# Patient Record
Sex: FEMALE | Race: WHITE | NOT HISPANIC OR LATINO | Employment: OTHER | URBAN - METROPOLITAN AREA
[De-identification: names, ages, dates, MRNs, and addresses within clinical notes are randomized per-mention and may not be internally consistent; named-entity substitution may affect disease eponyms.]

---

## 2024-03-20 ENCOUNTER — MEDICAL CORRESPONDENCE (OUTPATIENT)
Dept: HEALTH INFORMATION MANAGEMENT | Facility: CLINIC | Age: 39
End: 2024-03-20
Payer: COMMERCIAL

## 2024-03-20 ENCOUNTER — LAB REQUISITION (OUTPATIENT)
Dept: LAB | Facility: CLINIC | Age: 39
End: 2024-03-20

## 2024-03-20 ENCOUNTER — PATIENT OUTREACH (OUTPATIENT)
Dept: ONCOLOGY | Facility: CLINIC | Age: 39
End: 2024-03-20
Payer: COMMERCIAL

## 2024-03-20 ENCOUNTER — TRANSFERRED RECORDS (OUTPATIENT)
Dept: HEALTH INFORMATION MANAGEMENT | Facility: CLINIC | Age: 39
End: 2024-03-20
Payer: COMMERCIAL

## 2024-03-20 ENCOUNTER — TRANSCRIBE ORDERS (OUTPATIENT)
Dept: OTHER | Age: 39
End: 2024-03-20

## 2024-03-20 DIAGNOSIS — Z80.0 FAMILY HISTORY OF PANCREATIC CANCER: Primary | ICD-10-CM

## 2024-03-20 DIAGNOSIS — N92.1 EXCESSIVE AND FREQUENT MENSTRUATION WITH IRREGULAR CYCLE: ICD-10-CM

## 2024-03-20 PROCEDURE — 87389 HIV-1 AG W/HIV-1&-2 AB AG IA: CPT | Performed by: FAMILY MEDICINE

## 2024-03-20 PROCEDURE — 80061 LIPID PANEL: CPT | Performed by: FAMILY MEDICINE

## 2024-03-20 PROCEDURE — 83550 IRON BINDING TEST: CPT | Performed by: FAMILY MEDICINE

## 2024-03-20 PROCEDURE — 84443 ASSAY THYROID STIM HORMONE: CPT | Performed by: FAMILY MEDICINE

## 2024-03-20 PROCEDURE — 86803 HEPATITIS C AB TEST: CPT | Performed by: FAMILY MEDICINE

## 2024-03-20 NOTE — PROGRESS NOTES
Writer received referral to Cancer Risk Management/Genetic Counseling.    Referred for:     family history of pancreatic cancer        Referral reviewed for appropriate plan, and sent to New Patient Scheduling ([phone]) for completion.    Fátima Albert, RN, BSN  Oncology New Patient Nurse Navigator   [facility]  [phone]

## 2024-03-21 LAB
CHOLEST SERPL-MCNC: 150 MG/DL
FASTING STATUS PATIENT QL REPORTED: ABNORMAL
HCV AB SERPL QL IA: NONREACTIVE
HDLC SERPL-MCNC: 41 MG/DL
HIV 1+2 AB+HIV1 P24 AG SERPL QL IA: NONREACTIVE
IRON BINDING CAPACITY (ROCHE): 347 UG/DL (ref 240–430)
IRON SATN MFR SERPL: 31 % (ref 15–46)
IRON SERPL-MCNC: 107 UG/DL (ref 37–145)
LDLC SERPL CALC-MCNC: 93 MG/DL
NONHDLC SERPL-MCNC: 109 MG/DL
TRIGL SERPL-MCNC: 78 MG/DL
TSH SERPL DL<=0.005 MIU/L-ACNC: 1.85 UIU/ML (ref 0.3–4.2)

## 2024-05-29 ENCOUNTER — LAB REQUISITION (OUTPATIENT)
Dept: LAB | Facility: CLINIC | Age: 39
End: 2024-05-29

## 2024-05-29 DIAGNOSIS — Z12.4 ENCOUNTER FOR SCREENING FOR MALIGNANT NEOPLASM OF CERVIX: ICD-10-CM

## 2024-05-29 PROCEDURE — G0145 SCR C/V CYTO,THINLAYER,RESCR: HCPCS | Performed by: OBSTETRICS & GYNECOLOGY

## 2024-05-29 PROCEDURE — 87624 HPV HI-RISK TYP POOLED RSLT: CPT | Performed by: OBSTETRICS & GYNECOLOGY

## 2024-05-30 LAB
HPV HR 12 DNA CVX QL NAA+PROBE: NEGATIVE
HPV16 DNA CVX QL NAA+PROBE: NEGATIVE
HPV18 DNA CVX QL NAA+PROBE: NEGATIVE
HUMAN PAPILLOMA VIRUS FINAL DIAGNOSIS: NORMAL

## 2024-06-03 LAB
BKR LAB AP GYN ADEQUACY: NORMAL
BKR LAB AP GYN INTERPRETATION: NORMAL
PATH REPORT.COMMENTS IMP SPEC: NORMAL
PATH REPORT.COMMENTS IMP SPEC: NORMAL

## 2024-06-14 ENCOUNTER — HOSPITAL ENCOUNTER (EMERGENCY)
Facility: HOSPITAL | Age: 39
Discharge: HOME | End: 2024-06-14
Attending: EMERGENCY MEDICINE | Admitting: EMERGENCY MEDICINE
Payer: COMMERCIAL

## 2024-06-14 ENCOUNTER — APPOINTMENT (OUTPATIENT)
Dept: RADIOLOGY | Facility: HOSPITAL | Age: 39
End: 2024-06-14
Payer: COMMERCIAL

## 2024-06-14 VITALS
DIASTOLIC BLOOD PRESSURE: 77 MMHG | RESPIRATION RATE: 26 BRPM | SYSTOLIC BLOOD PRESSURE: 141 MMHG | TEMPERATURE: 98.7 F | WEIGHT: 200 LBS | HEART RATE: 62 BPM | OXYGEN SATURATION: 98 %

## 2024-06-14 DIAGNOSIS — R07.89 CHEST PAIN, NON-CARDIAC: ICD-10-CM

## 2024-06-14 PROBLEM — G47.30 SLEEP APNEA: Status: ACTIVE | Noted: 2024-06-14

## 2024-06-14 LAB
ANION GAP SERPL CALCULATED.3IONS-SCNC: 12 MMOL/L (ref 7–15)
BUN SERPL-MCNC: 13.4 MG/DL (ref 6–20)
CALCIUM SERPL-MCNC: 10.1 MG/DL (ref 8.6–10)
CHLORIDE SERPL-SCNC: 102 MMOL/L (ref 98–107)
CREAT SERPL-MCNC: 0.68 MG/DL (ref 0.51–0.95)
DEPRECATED HCO3 PLAS-SCNC: 25 MMOL/L (ref 22–29)
EGFRCR SERPLBLD CKD-EPI 2021: >90 ML/MIN/1.73M2
ERYTHROCYTE [DISTWIDTH] IN BLOOD BY AUTOMATED COUNT: 12.4 % (ref 10–15)
GLUCOSE SERPL-MCNC: 109 MG/DL (ref 70–99)
HCG UR QL: NEGATIVE
HCT VFR BLD AUTO: 40 % (ref 35–47)
HGB BLD-MCNC: 13.3 G/DL (ref 11.7–15.7)
MCH RBC QN AUTO: 28.5 PG (ref 26.5–33)
MCHC RBC AUTO-ENTMCNC: 33.3 G/DL (ref 31.5–36.5)
MCV RBC AUTO: 86 FL (ref 78–100)
PLATELET # BLD AUTO: 392 10E3/UL (ref 150–450)
POTASSIUM SERPL-SCNC: 3.8 MMOL/L (ref 3.4–5.3)
RBC # BLD AUTO: 4.67 10E6/UL (ref 3.8–5.2)
SODIUM SERPL-SCNC: 139 MMOL/L (ref 135–145)
TROPONIN T SERPL HS-MCNC: <6 NG/L
WBC # BLD AUTO: 16.7 10E3/UL (ref 4–11)

## 2024-06-14 PROCEDURE — 85014 HEMATOCRIT: CPT

## 2024-06-14 PROCEDURE — 36415 COLL VENOUS BLD VENIPUNCTURE: CPT

## 2024-06-14 PROCEDURE — 71046 X-RAY EXAM CHEST 2 VIEWS: CPT

## 2024-06-14 PROCEDURE — 99285 EMERGENCY DEPT VISIT HI MDM: CPT | Mod: 25

## 2024-06-14 PROCEDURE — 93005 ELECTROCARDIOGRAM TRACING: CPT | Performed by: EMERGENCY MEDICINE

## 2024-06-14 PROCEDURE — 84484 ASSAY OF TROPONIN QUANT: CPT

## 2024-06-14 PROCEDURE — 93005 ELECTROCARDIOGRAM TRACING: CPT | Performed by: STUDENT IN AN ORGANIZED HEALTH CARE EDUCATION/TRAINING PROGRAM

## 2024-06-14 PROCEDURE — 81025 URINE PREGNANCY TEST: CPT

## 2024-06-14 PROCEDURE — 80048 BASIC METABOLIC PNL TOTAL CA: CPT

## 2024-06-14 RX ORDER — FAMOTIDINE 20 MG/1
TABLET, FILM COATED ORAL
Qty: 20 TABLET | Refills: 0 | Status: SHIPPED | OUTPATIENT
Start: 2024-06-14 | End: 2024-06-14

## 2024-06-14 ASSESSMENT — ACTIVITIES OF DAILY LIVING (ADL)
ADLS_ACUITY_SCORE: 35
ADLS_ACUITY_SCORE: 35

## 2024-06-14 ASSESSMENT — COLUMBIA-SUICIDE SEVERITY RATING SCALE - C-SSRS
2. HAVE YOU ACTUALLY HAD ANY THOUGHTS OF KILLING YOURSELF IN THE PAST MONTH?: NO
6. HAVE YOU EVER DONE ANYTHING, STARTED TO DO ANYTHING, OR PREPARED TO DO ANYTHING TO END YOUR LIFE?: NO
1. IN THE PAST MONTH, HAVE YOU WISHED YOU WERE DEAD OR WISHED YOU COULD GO TO SLEEP AND NOT WAKE UP?: NO

## 2024-06-14 NOTE — ED TRIAGE NOTES
Pt was sitting reading at 1630 and began to experience chest discomfort that she thought was heartburn. She took pepto bismol and tums and 3 ibuprofen and got back to the couch and thought the sensation improved, but then she got flushed and lost hearing, got sweaty, dizzy and almost passed out. Pt then had all symptoms resolve and later put a bra on and started to feel the pain again. Pt is currently pain free in triage.      Triage Assessment (Adult)       Row Name 06/14/24 8906          Triage Assessment    Airway WDL WDL        Respiratory WDL    Respiratory WDL WDL        Skin Circulation/Temperature WDL    Skin Circulation/Temperature WDL WDL        Cardiac WDL    Cardiac WDL X  chest discomfort with near syncope at 1630 today, now resolved        Peripheral/Neurovascular WDL    Peripheral Neurovascular WDL WDL        Cognitive/Neuro/Behavioral WDL    Cognitive/Neuro/Behavioral WDL WDL

## 2024-06-15 NOTE — DISCHARGE INSTRUCTIONS
It was a pleasure taking care of you in the emergency department today. We saw you for chest pain. We ran some tests including an EKG and blood work, all of which were reassuring. The exact cause for your symptoms are unclear, but based on your evaluation today, they DO NOT appear to be due to a cause requiring emergent intervention at this time.    You may continue taking pepto bismol for acid reflux symptoms but I recommend omeprazole for 4 weeks as this works better for acid suppression.     Call 911 anytime you think you may need emergency care. For example, call if:    You have chest pain or pressure. This may occur with:  Sweating.  Shortness of breath.  Nausea or vomiting.  Pain that spreads from the chest to the neck, jaw, or one or both shoulders or arms.  Dizziness or lightheadedness.  A fast or uneven pulse.  After calling 911, chew 1 adult-strength aspirin. Wait for an ambulance. Do not try to drive yourself.     You have sudden chest pain and shortness of breath, or you cough up blood.   Call your doctor now or seek immediate medical care if:    You have any trouble breathing.     Your chest pain gets worse.     Your chest pain occurs consistently with exercise and is relieved by rest.   Watch closely for changes in your health, and be sure to contact your doctor if:    Your chest pain does not get better after 1 week.       We are always happy to help you out here at [facility] EMERGENCY DEPARTMENT. Take care.

## 2024-06-15 NOTE — ED PROVIDER NOTES
"Emergency Department Encounter  [facility] EMERGENCY DEPARTMENT  Melania Lopez   AGE: 39 year old SEX: female  YOB: 1985  MRN: [id number]      EVALUATION DATE & TIME: 6/14/2024  7:04 PM ; PCP: Daniella Edwards   ED PROVIDER: Ara Alcaraz PA-C    CHIEF COMPLAINT: Chest Pain (1630)      MEDICAL DECISION MAKING   Melania Lopez is a 39 year old female with a pertinent history of acid reflux and sleep apnea who presents to the ED for evaluation of chest pain that started earlier this evening when patient was relaxing and reading a book.  Patient initially thought the pain was due to heartburn and took Pepto-Bismol but the chest pain worsened and was associated with diaphoresis, dizziness, and ringing in ears.  Patient endorses that she felt like she was going to pass out.  At the time, pain described as \"squeezing\" and 8/10 in intensity.  No fevers, chills, nausea, vomiting, headache, abdominal pain, or shortness of breath.    Vitals reviewed and hemodynamically stable, afebrile.  On exam, patient is well-appearing and in no acute distress. No diaphoresis or dyspnea. Rhythm and rate regular without murmur, S3 gallop, or diminished or muffled heart sound. There is no focal wheezing or crackles. No asymmetric extremity swelling or pitting pedal edema. Abdomen is nontender tender.    Unsure the exact cause of patient's chest pain but it appears to be noncardiac in nature, possibly due to underlying GERD.  EKG does not show acute ischemic changes, cardiac enzymes are WNL, minimizing concern for ACS.  Chest XR independently interpreted and without evidence of pneumonia, widened mediastinum, abnormal aortic contour, mediastinal free air, pleural effusion, cardiomegaly, pneumothorax, and acute bony abnormality. CBC with mild leukocytosis (16.7) but without any other concerning signs of infection.  BMP without evidence of significant anemia, severe hemorrhage, electrolyte " abnormality, or kidney dysfunction. Patient is without significant risk factors for DVT or PE and Wells low risk, PERC negative and therefore additional workup with D-dimer not indicated. Patient is without signs of fluid overload minimizing concern for congestive heart failure. I have low suspicion for aortic dissection as the onset of pain was neither sudden nor severe, there is no pulse deficit in extremities, and the patient is without known aortic pathology. There was no proceeding forceful vomiting, trauma, or recent endoscopy to suggest esophageal rupture. I also considered, but have low suspicion for, pericarditis, myocarditis, pericardial effusion, and cardiac tamponade.    HEART Score 0 points putting patient at  0.9-1.7% risk of major adverse cardiac event. Patient does not have any signs of hemodynamic instability or significant respiratory distress and is without known coronary artery disease.  Patient has had an uneventful observation period, negative serial cardiac markers, and is therefore a good candidate for discharge home with close primary care follow up.  Prescription for omeprazole provided for better acid reflux control.    Medical Decision Making  Obtained supplemental history:Supplemental history obtained?: No  Reviewed external records: External records reviewed?: No  Care impacted by chronic illness:Other: Acid Reflux  Care significantly affected by social determinants of health:N/A  Did you consider but not order tests?: Work up considered but not performed and documented in chart, if applicable  Did you interpret images independently?: Independent interpretation of ECG and images noted in documentation, when applicable.  Consultation discussion with other provider:Did you involve another provider (consultant, , pharmacy, etc.)?: No  Discharge. I prescribed additional prescription strength medication(s) as charted. See documentation for any additional details.    FINAL IMPRESSION        "ICD-10-CM    1. Chest pain, non-cardiac  R07.89           ED COURSE   7:08 PM I met and introduced myself to the patient. I gathered initial history and performed my physical exam. We discussed plan for initial workup.  7:56 PM I have staffed the patient with Dr. Fernandes, ED MD, who has evaluated the patient and agrees with all aspects of today's care.   8:50 PM I rechecked the patient and discussed results, discharge, follow up, and reasons to return to the ED.     MEDICATIONS GIVEN IN THE EMERGENCY DEPARTMENT:   Medications - No data to display   NEW PRESCRIPTIONS STARTED AT TODAY'S ED VISIT:  Discharge Medication List as of 6/14/2024  8:56 PM        START taking these medications    Details   omeprazole (PRILOSEC) 20 MG DR capsule Take 1 capsule (20 mg) by mouth daily, Disp-30 capsule, R-0, Local Print                 =================================================================         HISTORY OF PRESENT ILLNESS   Patient information was obtained from: Patient   Use of Intrepreter: None    Melania Lopez is a 39 year old female with a pertinent history of acid reflux and sleep apnea who presents to the ED by walk-in for evaluation of chest pain.     Today, the patient was sitting on her couch reading a book when she had a sudden onset of chest pain which she initially thought was heartburn. The patient mentions going upstairs and taking Pepto Bismol, however, upon coming down the stairs to return back to the couch patient reports a change to her symptoms, including the heart burn becoming more of a \"squeezing,\" substernal sensation, with associated diaphoresis, dizziness, and hearing changes. She endorses that in the past when her hearing changed, as it did today, she almost passes out. Reports this being the same feeling today. The symptoms only lasted briefly, then resolved. Upon calling someone to see if anyone would be able to bring her to be evaluated, she reports the symptoms returning with associated " nausea. She then was brought here and notes that previous to arriving her symptoms again subsided and she has not had them resurface since being here.     She denies any swelling in her legs, shortness of breath, chest pain, abdominal pain, calf tenderness, pain with breathing or recent illnesses.     The patient reports that she has a history of heart burn, and that it has been under control for the past 3 years. Patient avoids triggers and sleeps elevated to keep her heart burn under control. She mentions having a family history of blood clots, and that her last menstrual period was 2 weeks ago. Reports having sleep apnea and using a mouth guard for the past 3 weeks. Denies any recent travel or being on a birth control. Patient does, however, mention working from home and does sit quite a lot.     Otherwise in normal state of health. No further concerns at this time.       MEDICAL HISTORY     No past medical history on file.    History reviewed. No pertinent surgical history.    No family history on file.         omeprazole (PRILOSEC) 20 MG DR capsule        PHYSICAL EXAM   VITALS:  Patient Vitals for the past 24 hrs:   BP Temp Temp src Pulse Resp SpO2 Weight   06/14/24 2030 (!) 141/77 -- -- 62 26 98 % --   06/14/24 2020 135/74 -- -- 62 19 97 % --   06/14/24 2007 133/76 -- -- 64 13 99 % --   06/14/24 1947 131/73 -- -- 55 14 98 % --   06/14/24 1752 (!) 161/88 98.7  F (37.1  C) Oral 63 18 99 % 90.7 kg (200 lb)     There is no height or weight on file to calculate BMI.     Vitals reviewed. Nursing notes reviewed.  CONSITUTIONAL: Generally well appearing female in no acute distress. Well developed and nourished.   EYES: Conjunctivae clear, no discharge. EOM grossly intact.  HENT: Normocephalic, atraumatic, mucous membranes moist, nose normal.  NECK: Supple, gross ROM intact.   RESPIRATORY: Effort normal, speaks in full sentences.  Lungs clear to auscultation bilaterally without rhonchi, wheezes, rales.   CARDIO:  Normal heart rate, regular rhythm, no murmurs. No pedal edema.   GI: Soft, nondistended. Abdomen is non tender to palpation.  MSK: Moving all 4 extremities intentionally and without pain.  SKIN: Warm, dry. No rashes or lesions.  NEURO:  AxOx4. CN II-XII grossly intact, but not individually tested. No focal neurological deficits.   PSYCH: Thoughts linear and responses appropriate. Cooperative. Appropriate mood and affect.     LABS AND IMAGING   All pertinent labs reviewed and interpreted  Labs Ordered and Resulted from Time of ED Arrival to Time of ED Departure   CBC WITH PLATELETS - Abnormal       Result Value    WBC Count 16.7 (*)     RBC Count 4.67      Hemoglobin 13.3      Hematocrit 40.0      MCV 86      MCH 28.5      MCHC 33.3      RDW 12.4      Platelet Count 392     BASIC METABOLIC PANEL - Abnormal    Sodium 139      Potassium 3.8      Chloride 102      Carbon Dioxide (CO2) 25      Anion Gap 12      Urea Nitrogen 13.4      Creatinine 0.68      GFR Estimate >90      Calcium 10.1 (*)     Glucose 109 (*)    TROPONIN T, HIGH SENSITIVITY - Normal    Troponin T, High Sensitivity <6     HCG QUALITATIVE URINE - Normal    hCG Urine Qualitative Negative         Chest XR,  PA & LAT   Final Result   IMPRESSION: Negative chest.          ECG:  Performed at: [facility] EMERGENCY DEPARTMENT    Impression: Sinus bradycardia. Possible left atrial enlargement  Rate: 58 BPM  Rhythm: Sinus  Axis: normal  WI Interval: 128 ms  QRS Interval: 86 ms  QTc Interval: 422 ms  ST Changes: None  Comparison: No previous EKGs    EKG results reviewed and interpreted by Dr. Dottie Fernandes, ED MD.     Melvi STRAUSS, am serving as a scribe to document services personally performed by Ara Alcaraz PA-C, based on my observation and the provider's statements to me. Ara STRAUSS PA-C attest that Melvi Fournier is acting in a scribe capacity, has observed my performance of the services and has documented them  in accordance with my direction.     Ara Alcaraz PA-C   Emergency Medicine   [facility] EMERGENCY DEPARTMENT  [facility] [facility] [id number] [phone]  Dept: [phone]      Ara Alcaraz PA-C  06/15/24 0026

## 2024-06-15 NOTE — ED PROVIDER NOTES
Emergency Department Staff Note  I had a face to face encounter with this patient seen by the Advanced Practice Provider (RICHARD).  I personally made/approved the management plan and take responsibility for the patient management. I personally saw the patient and performed a substantive portion of the visit including all aspects of the medical decision making.      ED Course as of 06/14/24 2116 Fri Jun 14, 2024 2056 Patient is a pleasant 39-year-old female who comes in today for evaluation of some chest discomfort.  She reports that it started around 430 and she initially thought it was heartburn.  She denied any associated shortness of breath.  It was in her lower mid chest and radiated into her back which is pretty typical.  She took some ibuprofen and Pepto-Bismol initially.  She did not really get much relief so decided to come in here and get checked out.  She says her heartburn has been well-controlled for the last 3 years with diet control.  She is not currently on an acid suppressing medicine.  She had clear lung sounds on my exam.  Troponin was negative.  EKG was unremarkable.  Chest x-ray was negative.  I do not think we can to find an emergent cause at this time and I think she can try a course of omeprazole at home and see if that helps.  I discussed all of this with her and she is in agreement with the plan.       EKG  Date and time: June 14, 2024 at 1758  Rate: 58 bpm  Rhythm: Sinus bradycardia  VA interval: 128 ms  QRS interval: 86 ms  QT/QTc: 430/422 ms  ST changes or T wave changes: No acute ST or T wave abnormality  Change from prior ECG: No prior to compare to  I have independently reviewed and interpreted this EKG.     I independently interpreted the following study(s): Chest x-ray was negative for pneumothorax.  See full radiology report for all details.    [facility] EMERGENCY DEPARTMENT  MD Dena Clemons, Dottie Villeda MD  06/14/24 2117

## 2024-06-18 LAB
ATRIAL RATE - MUSE: 58 BPM
DIASTOLIC BLOOD PRESSURE - MUSE: NORMAL MMHG
INTERPRETATION ECG - MUSE: NORMAL
P AXIS - MUSE: 53 DEGREES
PR INTERVAL - MUSE: 128 MS
QRS DURATION - MUSE: 86 MS
QT - MUSE: 430 MS
QTC - MUSE: 422 MS
R AXIS - MUSE: 28 DEGREES
SYSTOLIC BLOOD PRESSURE - MUSE: NORMAL MMHG
T AXIS - MUSE: 27 DEGREES
VENTRICULAR RATE- MUSE: 58 BPM

## 2024-07-03 ENCOUNTER — VIRTUAL VISIT (OUTPATIENT)
Dept: ONCOLOGY | Facility: CLINIC | Age: 39
End: 2024-07-03
Attending: FAMILY MEDICINE
Payer: COMMERCIAL

## 2024-07-03 DIAGNOSIS — Z80.0 FAMILY HISTORY OF MALIGNANT NEOPLASM OF PANCREAS: Primary | ICD-10-CM

## 2024-07-03 DIAGNOSIS — Z80.3 FAMILY HISTORY OF MALIGNANT NEOPLASM OF BREAST: ICD-10-CM

## 2024-07-03 DIAGNOSIS — Z80.0 FAMILY HISTORY OF MALIGNANT NEOPLASM OF COLON: ICD-10-CM

## 2024-07-03 PROCEDURE — 96040 HC GENETIC COUNSELING, EACH 30 MINUTES: CPT | Mod: GT,95 | Performed by: GENETIC COUNSELOR, MS

## 2024-07-03 NOTE — PATIENT INSTRUCTIONS
Assessing Cancer Risk  Only about 5-10% of cancers are thought to be due to an inherited cancer susceptibility gene. These families often have:  Several people with the same or related types of cancer  Cancers diagnosed at a young age (before age 50)  Individuals with more than one primary cancer  Multiple generations of the family affected with cancer    Some people may be candidates for genetic testing of more than one gene.  For these families, genetic testing using a cancer panel may be offered.  These panels can test many genes at once known to increase the risk for pancreatic (and other) cancers: APC, ALESSANDRA, BRCA1, BRCA2, CDKN2A, EPCAM, MLH1, MSH2, MSH6, PALB2, PMS2, STK11, and TP53. The purpose of this handout is to serve as a brief summary of the pancreatic cancer risk genes and cancer syndrome with pancreatic cancer risks.     Hereditary Breast and Ovarian Cancer Syndrome  (BRCA1 and BRCA2)    A single mutation in one of the copies of BRCA1 or BRCA2 increases the risk for breast and ovarian cancer. The risk for pancreatic cancer approximately 5-10%. BRCA2 is considered the most common gene responsible for familial pancreatic cancer.    A person s ethnic background is also important to consider, as individuals of Ashkenazi Mosque ancestry have a higher chance of having a BRCA gene mutation. There are three BRCA mutations that occur more frequently in this population.    Chahal Syndrome  (MLH1, MSH2, MSH6, PMS2, and EPCAM)    Currently five genes are known to cause Chahal Syndrome: MLH1, MSH2, MSH6, PMS2, and EPCAM.  A single mutation in one of the Chahal Syndrome genes increases the risk for colon, endometrial, ovarian, and stomach cancers.  Other cancers that occur less commonly in Chahal Syndrome include urinary tract cancers, brain cancers, and other cancers. People who have Chahal Syndrome have up to a 6% risk of pancreatic cancer. MLH1 has the highest risk for pancreatic cancer amongst the Chahal syndrome  genes.    *Cancer risk varies depending on Chahal syndrome gene found    Familial Atypical Multiple Mole Melanoma Syndrome  (CDKN2A)    Familial Atypical Multiple Mole Melanoma Syndrome (FAMMM) is caused by a single mutation in the CDKN2A gene. This gene used to be called p16. The lifetime risk for melanoma is between 58-92%5. People with FAMMM have increased risks for pancreatic cancer, and possibly other cancers.      People with FAMMM typically have many moles (more than 50), which can be atypical. The exact risk of pancreatic cancer can depend on a person s specific CDKN2A mutation. The risk for pancreatic cancer at least 15%, but may be higher depending on a person's specific mutation.    Peutz-Jeghers Syndrome  (STK11)    Peutz-Jeghers Syndrome is caused by a mutation in the STK11 gene. The main features of Peutz-Jeghers Syndrome are multiple hamartomatous colon polyps and blue pigmentation of the lips and oral mucosa. Cancers associated with Peutz-Jeghers Syndrome include: gastrointestinal, gynecological, lung, breast, and pancreatic cancer. The risk of developing pancreatic cancer at least 15% for those with Peutz-Jeghers syndrome.     Li-Fraumeni Syndrome  (TP53)    Li-Fraumeni Syndrome (LFS) is a cancer predisposition syndrome caused by a mutation in the TP53 gene. A single mutation in one of the copies of TP53 increases the risk for multiple cancers. Individuals with LFS are at an increased risk for developing cancer at a young age. The lifetime risk for development of a LFS-associated cancer is 50% by age 30 and 90% by age 60. The risk for pancreatic cancer specifically is approximately 5-10%.  Core Cancers: Sarcomas, Breast, Brain, Lung, Leukemias/Lymphomas, Adrenocortical carcinomas  Other Cancers: Gastrointestinal (including pancreatic), Thyroid, Skin, Genitourinary    Familial Adenomatous Polyposis Syndrome  (APC)    Familial Adenomatous Polyposis syndrome (FAP) is caused by a single mutation in the  APC gene and is characterize by having over 100 adenomatous polyps in the colon and significantly increased risk for colon cancer. These typically appear during adolescence. FAP is associated with other tumors in the thyroid, stomach, and duodenum. People with FAP have an increased lifetime pancreatic cancer risk over the general population, however, the exact risk is currently not known at this time.    Additional Genes  PALB2  Mutations in the PALB2 gene have been shown to increase the risk of breast cancer up to 58% in some families. PALB2 mutations have also been associated with increased risk for pancreatic cancer. Individuals who inherit two PALB2 mutations--one from their mother and one from their father--have a condition called Fanconi Anemia. This condition is associated with short stature, developmental delay, bone marrow failure, and increased risk for childhood cancers.    ALESSANDRA  Mutations in the ALESSANDRA gene typically increase the risk of breast cancer 2-4 times higher than an average woman. ALESSANDRA mutations have also been associated with an increased risk of pancreatic cancer. People who inherit an ALESSANDRA mutation from both their mother and father have a condition called ataxia-telangiectasia. Ataxia telangiectasia is associated with ataxia in childhood (trouble with balance and walking), telangiectasias (red or purple spotty clusters on the skin), involuntary movements, frequent infections, and increased risk of leukemia and lymphoma.     Inheritance    All of the genes reviewed above are inherited in an autosomal dominant pattern.  This means that if a parent has a mutation, each of their children will have a 50% chance of inheriting that same mutation.  Every child--male or female--would have a 50% chance of inheriting the mutation and being at increased risk for developing cancer.       https://r.nlm.nih.gov/primer/inheritance/inheritancepatterns    Mutations in some genes can occur de shira, which means that  a person s mutation occurred for the first time in them and was not inherited from a parent.  Now that they have the mutation, however, it can be passed on to future generations.     Genetic Testing  Genetic testing involves a blood test and will look for any harmful mutations that are associated with increased cancer risk.  If possible, it is recommended that the person(s) who has had cancer be tested before other family members.  That person will give us the most useful information about whether or not a specific gene is associated with the cancer in the family.    Advantages and Disadvantages   There are advantages and disadvantages to genetic testing.  Advantages  May clarify your cancer risk and additional appropriate cancer screenings   Can help you make medical decisions  May explain the cancers in your family  May give useful information to your family members (if you share your results)     Disadvantages  Possible negative emotional impact of learning about inherited cancer risk  Uncertainty in interpreting a negative test result in some situations  Possible genetic discrimination concerns (see below)     Genetic Information Nondiscrimination Act (MARCOS)  MARCOS is a federal law that protects individuals from health insurance or employment discrimination based on a genetic test result.  There are currently no legal discrimination protections in terms of life insurance, long term care, or disability insurances.  Visit the National Human Genome Research [place] website to learn more: https://www.genome.gov/65655707/genetic-discrimination/    Questions to Think About Regarding Genetic Testing:  What effect will the test result have on me and my relationship with my family members if I have an inherited gene mutation?  If I don t have a gene mutation?  Should I share my test results, and how will my family react to this news, which may also affect them?  Are my children ready to learn new information that may  one day affect their own health?    There are three possible results of genetic testing:  Positive--a harmful mutation was identified in one or more of the genes  Negative--no mutation was identified in any of the genes on this panel  Variant of unknown significance (VUS)--a variation in one of the genes was identified, but it is unclear how this impacts cancer risk in the family  Families with VUS results can contact their genetic counselor annually for updates     Reducing Cancer Risk  Recommendations are based upon an individual s genetic test result as well as their personal and family history of cancer. Pancreatic cancer screening may be available based on family history. Talk with your physician about screening options.     Cancer Resources    National Pancreatic Cancer Foundation: npcf.   Pancreatic Cancer Action Network: pancan.org   FORCE: Facing Our Risk of Cancer Empowered: facingourrisk.org  Bright [place]: bebrightpink.org  Li-Fraumeni Syndrome Association: lfsassociation.org  Collaborative Group of the Americas on Inherited Colorectal Cancer (CGA)   cgaicc.[employer] http://www.facingourrisPurpose Global.org/  Cancer Care: cancercare.org  American Cancer Society (ACS): cancer.org  National Cancer [place] (NCI): cancer.gov      Please call us if you have any questions or concerns.   Cancer Risk Management Program [phone] ([phone])  Shayne Hooks, MS [facility] [place]  [phone]  Maribeth Daugherty, MS, [facility] [place] [phone]  Katalina Blackwood, MS, [facility] [place]  [phone]  Evi Wade, MS, [facility] [place]  [phone]  Rola Noel, MS, [facility] [place]  [phone]  Jesika Nichols, MS, [facility] [place] [phone]  Rosemary Lisa, MS, [facility] [place] [phone]    References  Genetic / Familial High-Risk Assessment?: Breast and Ovarian. NCCN Pract Guidel Oncol. 2017;1.2018.  Garett J, Quinn DUEÑAS, Martina J, et al. The incidence of pancreatic cancer in BRCA1 and BRCA2 mutation carriers. Br J Cancer. 2012;107(12):8388-4471. doi:10.1038/bjc.2012.483.  Luisito LEMOS, Maki KG, Lamberto S, et al.  BRCA1, BRCA2, PALB2, and CDKN2A mutations in familial pancreatic cancer: a PACGENE study. [facility] Med. 2015;17(7):569-577. doi:10.1038/gim.2014.153.  Hector YIP. Genetic / Familial High-Risk Assessment?: Colorectal. NCCN Pract Guidel Oncol. 2017;2.2017.  Erica CARNEY,  M, Radha E, Ankita J. Familial Atypical Multiple Mole Melanoma Syndrome. National Center for Biotechnology Information (); 2009. http://www.ncbi.nlm.nih.gov/pubmed/48940791. Accessed October 10, 2017.  Chahal HT, Fusbrandie RM, Ifeoma J, et al. Tumour spectrum in the FAMMM syndrome. Br J Cancer. 1981;44(4):553-560. http://www.ncbi.nlm.nih.gov/pubmed/5622426. Accessed October 10, 2017.  Bronson F, Jennifer J, Elvira A, et al. Very high risk of cancer in familial Peutz-Jeghers syndrome. Gastroenterology. 2000;119(6):0419-9844. doi:10.1053/PURVI.2000.87951.  Bronson FM, Offerhaus GJ, Pedro DH, et al. Increased risk of thyroid and pancreatic carcinoma in familial adenomatous polyposis. Gut. 1993;34(10):0136-3178. doi:10.1136/GUT.34.10.1394.

## 2024-07-03 NOTE — PROGRESS NOTES
7/3/2024    Referring Provider: Daniella Edwards MD    Presenting Information:   I met with Melania for her video genetic counseling visit, through the Cancer Risk Management Program, to discuss her family history of pancreatic, breast, and colon cancer. Today we reviewed this history, cancer screening recommendations, and available genetic testing options.    Personal History:  Melania is a 39 year old year old female. She does not have any personal history of cancer. She had her first menstrual period at age 11, she does not have children, and is premenopausal. Melania has her ovaries, fallopian tubes and uterus in place, and she has had no ovarian cancer screening to date. She reports that she has not used hormone replacement therapy. Melania has not had a mammogram or colonoscopy. She does not regularly do any other cancer screening at this time.     Family History: (Please see scanned pedigree for detailed family history information)  Melania's father was diagnosed with and  from pancreatic cancer at age 60. He is reported to have a history of smoking.  Melania's paternal grandmother was diagnosed with pancreatic cancer at age 46 and  at age 49. She is reported to have a history of smoking.  Her mother, Melania's great grandmother, was diagnosed with breast and pancreatic cancer at age 42 and also  at age 42.  A paternal aunt had brain surgery but it is unknown if this was due to a tumor or some other health concern.  Melania's maternal grandfather  from colon cancer at age 65.  There is no other reported maternal family history of cancer.  Her maternal ethnicity is [place], English, and Scandinavian. Her paternal ethnicity is [place] and [place]. There is no known Ashkenazi Congregational ancestry on either side of her family. There is no reported consanguinity.    Discussion:  Melania's family history of pancreatic, breast, and colon cancer is suggestive of a hereditary cancer syndrome.  We reviewed the  features of sporadic, familial, and hereditary cancers. We discussed that mutations in either BRCA1 or BRCA2 could be possible hereditary explanations for her family history of cancer. Mutations in the BRCA1 or BRCA2 gene are known to cause Hereditary Breast and Ovarian Cancer Syndrome (HBOC). HBOC typically presents with multiple family members diagnosed with breast cancer before age 50 and/or ovarian cancer. Other cancer risks associated with HBOC include male breast cancer, prostate cancer, pancreatic cancer, and melanoma.   We discussed the natural history and genetics of hereditary cancer. A detailed handout regarding hereditary cancer, along with the other information we discussed, will be mailed to Melania at the end of our appointment today and can be found in the after visit summary. Topics included: inheritance pattern, cancer risks, cancer screening recommendations, and also risks, benefits and limitations of testing.  Based on her personal and family history, Melania meets current National Comprehensive Cancer Network (NCCN) criteria for genetic testing of BRCA1/2 along with other high-penetrance pancreatic cancer susceptibility genes (I.e. ALESSANDRA, CDKN2A, EPCAM, MLH1, MSH2, MSH6, PALB2, STK11, and TP53).  We discussed that there are additional genes that could cause increased risk for pancreatic, breast, and/or colon cancer. As many of these genes present with overlapping features in a family and accurate cancer risk cannot always be established based upon the pedigree analysis alone, it would be reasonable for Melania to consider panel genetic testing to analyze multiple genes at once.  Genetic testing is available for BRCA1/2 as part of the patient's core panel. This will then be automatically reflexed to [facility]'s Multi-Cancer panel.  Genetic testing is available for 70 genes associated with increased risk for many different cancers: Multi-Cancers panel (AIP, ALK, APC, ALESSANDRA, AXIN2, BAP1, BARD1, BLM, BMPR1A,  BRCA1, BRCA2, BRIP1, CDC73, CDH1, CDK4, CDKN1B, CDKN2A, CHEK2, CTNNA1, DICER1, EGFR, EPCAM, FH, FLCN, GREM1, HOXB13, KIT, LZTR1, MAX, MBD4, MEN1, MET, MITF, MLH1, MSH2, MSH3, MSH6, MUTYH, NF1, NF2, NTHL1, PALB2, PDGFRA, PMS2, POLD1, POLE, POT1, PQQPZ0P, PTCH1, PTEN, RAD51C, RAD51D, RB1, RET, SDHA, SDHAF2, SDHB, SDHC, SDHD, SMAD4, SMARCA4, SMARCB1, SMARCE1, STK11, SUFU, FLIP949, TP53, TSC1, TSC2, and VHL).  We discussed that many of the genes in the Multi-Cancers panel are associated with specific hereditary cancer syndromes and have published management guidelines: Hereditary Breast and Ovarian Cancer syndrome (BRCA1, BRCA2), Chahal syndrome (MLH1, MSH2, MSH6, PMS2, EPCAM), Familial Adenomatous Polyposis (APC), Hereditary Diffuse Gastric Cancer (CDH1), Familial Atypical Multiple Mole Melanoma syndrome (CDK4, CDKN2A), Juvenile Polyposis syndrome (BMPR1A, SMAD4), Cowden syndrome (PTEN), Li Fraumeni syndrome (TP53), Peutz-Jeghers syndrome (STK11), MUTYH Associated Polyposis (MUTYH), Hereditary Leiomyomatosis and Renal Cell Cancer (FH), Wtcw-Vgts-Mlap (FLCN), Hereditary Papillary Renal Carcinoma (MET), Hereditary Paraganglioma and Pheochromocytoma syndrome (SDHA, SDHAF2, SDHB, SDHC, SDHD), Multiple Endocrine Neoplasia type 2 (RET), Tuberous sclerosis complex (TSC1, TSC2), Von Hippel-Lindau disease (VHL), Neurofibromatosis type 1 (NF1), Neurofibromatosis type 2 (NF2), Multiple Endocrine Neoplasia type 1 (MEN1), Multiple Endocrine Neoplasia type 4 (CDKN1B), Gorlin syndrome (SUFU, PTCH1), and Leone complex (AFHVN3B).  The ALESSANDRA, AXIN2, BARD1, BRIP1, CHEK2, GREM1, MSH3, NTHL1, PALB2, POLD1, POLE, RAD51C, and RAD51D genes are associated with increased cancer risk and have published management guidelines for certain cancers.    The remaining genes (AIP, ALK, BAP1, BLM, CDC73, CTNNA1, DICER1, EGFR, HOXB13, KIT, LZTR1, MAX, MBD4, MITF, PDGFRA, POT1, REST, RB1, SMARCA4, SMARCB1, SMARCE1, and DXOA935) are associated with increased  cancer risk and may allow us to make medical recommendations when mutations are identified.   Melania stated that she would prefer to submit a saliva kit for her genetic testing. [facility] will send a kit directly to her home with directions on how to collect a saliva sample. We discussed that there is a small chance for sample failure due to contamination of the sample. To help minimize this, she should follow the directions that are sent with the kit. Melania verbalized understanding of this. Once the sample is collected, she will send it to [facility] using the return envelope and prepaid shipping label.   Verbal consent was given over video and written on the consent form. Turnaround time is approximately 4 weeks once the lab receives the sample.  Should Melania have any questions regarding the billing for her genetic testing, she should visit [facility]'s billing website at [email or web address] or call them at [phone].  The possible outcomes of positive, negative, and uncertain were discussed with Melania. A detailed handout that explains this in detail was provided to the patient.  Medical Management: For Melania, we reviewed that the information from genetic testing may determine:  additional cancer screening for which Melania may qualify (i.e. mammogram and breast MRI, more frequent colonoscopies, more frequent dermatologic exams, etc.),  options for risk reducing surgeries Melania could consider (i.e. bilateral mastectomy, surgery to remove her ovaries and/or uterus, etc.),    and targeted chemotherapies if she were to develop certain cancers in the future (i.e. immunotherapy for individuals with Chahal syndrome, PARP inhibitors, etc.).   These recommendations and possible targeted chemotherapies will be discussed in detail once genetic testing is completed.     Plan:  1) Today Melania elected to proceed with BRCA1/2 testing with automatic reflex to [facility]'s Multi-Cancers  panel.  2) A copy of the consent form and the after visit summary will be sent to Melania.  3) This information should be available in approximately 4 weeks, once the lab receives the sample.  4) I will call Melania with the results once they become available.    Time spent on video: 23 minutes    Shayne Hooks MS, [facility] [place]  Licensed, Certified Genetic Counselor      Virtual Visit Details    Type of service:  Video Visit     Originating Location (pt. Location): Home  Distant Location (provider location):  Off-site  Platform used for Video Visit: Erika

## 2024-07-03 NOTE — Clinical Note
"7/3/2024      Melania Lopez  [address] [place] [address]      Dear Colleague,    Thank you for referring your patient, Melania Lopez, to the [facility]. Please see a copy of my visit note below.    7/3/2024    Virtual Visit Details    Type of service:  Video Visit     Originating Location (pt. Location): {video visit patient location:253986::\"Home\"}  {PROVIDER LOCATION On-site should be selected for visits conducted from your clinic location or adjoining [facility] hospital, academic office, or other nearby [facility] building. Off-site should be selected for all other provider locations, including home:502891}  Distant Location (provider location):  {virtual location provider:208922}  Platform used for Video Visit: {Virtual Visit Platforms:355686::\"VertiFlex\"}    Referring Provider: Daniella Edwards MD    Presenting Information:   I met with Melania for her video genetic counseling visit, through the Cancer Risk Management Program, to discuss her family history of pancreatic, breast, and colon cancer. Today we reviewed this history, cancer screening recommendations, and available genetic testing options.    Personal History:  Melania is a 39 year old year old female. She does not have any personal history of cancer. She had her first menstrual period at age 11, she does not have children, and is premenopausal. Melania has her ovaries, fallopian tubes and uterus in place, and she has had no ovarian cancer screening to date. She reports that she has not used hormone replacement therapy. Melania has not had a mammogram or colonoscopy. She does not regularly do any other cancer screening at this time.     Family History: (Please see scanned pedigree for detailed family history information)  Melania's father was diagnosed with and  from pancreatic cancer at age 60. ***genetic testing  Melania's paternal grandmother was diagnosed with pancreatic cancer at age 42 and  at age 46.  Her mother, " Melania's great grandmother, was diagnosed with breast and pancreatic cancer at unknown ages.***  Melania's maternal grandfather  from colon cancer at age 65.  There is no other reported maternal family history of cancer.  Her maternal ethnicity is Guatem[place], English, and Scandinavian. Her paternal ethnicity is Luxembourgish [place]divian and [place]. There is no known Ashkenazi Bahai ancestry on either side of her family. There is no reported consanguinity.    Discussion:  Melania's personal and family history of *** is suggestive of a hereditary cancer syndrome.  We reviewed the features of sporadic, familial, and hereditary cancers. ***  We discussed the natural history and genetics of hereditary cancer. A detailed handout regarding hereditary cancer, along with the other information we discussed, will be mailed to Melania at the end of our appointment today and can be found in the after visit summary. Topics included: inheritance pattern, cancer risks, cancer screening recommendations, and also risks, benefits and limitations of testing.  Based on her personal and family history, Melania meets current National Comprehensive Cancer Network (NCCN) criteria for genetic testing of ***.  We discussed that there are additional genes that could cause increased risk for pancreatic, breast, and/or colon cancer. As many of these genes present with overlapping features in a family and accurate cancer risk cannot always be established based upon the pedigree analysis alone, it would be reasonable for Melania to consider panel genetic testing to analyze multiple genes at once.  Genetic testing is available for *** as part of the patient's core panel. This will then be automatically reflexed to *** panel.  ***  ***Melania stated that she would prefer to submit a saliva kit for her genetic testing. [facility] will send a kit directly to her home with directions on how to collect a saliva sample. We discussed that there is a small chance for sample  failure due to contamination of the sample. To help minimize this, she should follow the directions that are sent with the kit. Melania verbalized understanding of this. Once the sample is collected, she will send it to [employer] using the return envelope and prepaid shipping label.   Verbal consent was given over video and written on the consent form. Turnaround time is approximately 4 weeks once the lab receives the sample.  Should Melania have any questions regarding the billing for her genetic testing, she should visit [employer]'s billing website at [email or web address] or call them at [phone].  The possible outcomes of positive, negative, and uncertain were discussed with Melania. A detailed handout that explains this in detail was provided to the patient.  Medical Management: For Melania, we reviewed that the information from genetic testing may determine:  additional cancer screening for which Melania may qualify (i.e. mammogram and breast MRI, more frequent colonoscopies, more frequent dermatologic exams, etc.),  options for risk reducing surgeries Melania could consider (i.e. bilateral mastectomy, surgery to remove her ovaries and/or uterus, etc.),    and targeted chemotherapies if she were to develop certain cancers in the future (i.e. immunotherapy for individuals with Chahal syndrome, PARP inhibitors, etc.).   These recommendations and possible targeted chemotherapies will be discussed in detail once genetic testing is completed.     Plan:  1) Today Melania elected to proceed with *** testing with automatic reflex to ***.  2) A copy of the consent form and the after visit summary will be sent to Melania.  3) This information should be available in approximately 4 weeks, once the lab receives the sample.  4) I will call Melania with the results once they become available.    Time spent on video: *** minutes    Shayne Hooks MS, CGC  Licensed, Certified Genetic  Counselor    ***      Again, thank you for allowing me to participate in the care of your patient.        Sincerely,        Shayne Hooks GC

## 2024-07-03 NOTE — LETTER
July 3, 2024    Melania Lopez  [address]  [place] [address]      Dear Melania,    It was a pleasure speaking with you over video on 7/3/2024. Here is a copy of the progress note from our discussion. If you have any additional questions, please feel free to call.    Referring Provider: Daniella Edwards MD    Presenting Information:   I met with Melania for her video genetic counseling visit, through the Cancer Risk Management Program, to discuss her family history of pancreatic, breast, and colon cancer. Today we reviewed this history, cancer screening recommendations, and available genetic testing options.    Personal History:  Melania is a 39 year old year old female. She does not have any personal history of cancer. She had her first menstrual period at age 11, she does not have children, and is premenopausal. Melania has her ovaries, fallopian tubes and uterus in place, and she has had no ovarian cancer screening to date. She reports that she has not used hormone replacement therapy. Melania has not had a mammogram or colonoscopy. She does not regularly do any other cancer screening at this time.     Family History: (Please see scanned pedigree for detailed family history information)  Melania's father was diagnosed with and  from pancreatic cancer at age 60. He is reported to have a history of smoking.  Melania's paternal grandmother was diagnosed with pancreatic cancer at age 46 and  at age 49. She is reported to have a history of smoking.  Her mother, Melania's great grandmother, was diagnosed with breast and pancreatic cancer at age 42 and also  at age 42.  A paternal aunt had brain surgery but it is unknown if this was due to a tumor or some other health concern.  Melania's maternal grandfather  from colon cancer at age 65.  There is no other reported maternal family history of cancer.  Her maternal ethnicity is Luxembourger, English, and Scandinavian. Her paternal ethnicity is [place] and  Togolese. There is no known Ashkenazi Catholic ancestry on either side of her family. There is no reported consanguinity.    Discussion:  Melania's family history of pancreatic, breast, and colon cancer is suggestive of a hereditary cancer syndrome.  We reviewed the features of sporadic, familial, and hereditary cancers. We discussed that mutations in either BRCA1 or BRCA2 could be possible hereditary explanations for her family history of cancer. Mutations in the BRCA1 or BRCA2 gene are known to cause Hereditary Breast and Ovarian Cancer Syndrome (HBOC). HBOC typically presents with multiple family members diagnosed with breast cancer before age 50 and/or ovarian cancer. Other cancer risks associated with HBOC include male breast cancer, prostate cancer, pancreatic cancer, and melanoma.   We discussed the natural history and genetics of hereditary cancer. A detailed handout regarding hereditary cancer, along with the other information we discussed, will be mailed to Melania at the end of our appointment today and can be found in the after visit summary. Topics included: inheritance pattern, cancer risks, cancer screening recommendations, and also risks, benefits and limitations of testing.  Based on her personal and family history, Melania meets current National Comprehensive Cancer Network (NCCN) criteria for genetic testing of BRCA1/2 along with other high-penetrance pancreatic cancer susceptibility genes (I.e. ALESSANDRA, CDKN2A, EPCAM, MLH1, MSH2, MSH6, PALB2, STK11, and TP53).  We discussed that there are additional genes that could cause increased risk for pancreatic, breast, and/or colon cancer. As many of these genes present with overlapping features in a family and accurate cancer risk cannot always be established based upon the pedigree analysis alone, it would be reasonable for Melania to consider panel genetic testing to analyze multiple genes at once.  Genetic testing is available for BRCA1/2 as part of the patient's  core panel. This will then be automatically reflexed to [facility]'s Multi-Cancer panel.  Genetic testing is available for 70 genes associated with increased risk for many different cancers: Multi-Cancers panel (AIP, ALK, APC, ALESSANDRA, AXIN2, BAP1, BARD1, BLM, BMPR1A, BRCA1, BRCA2, BRIP1, CDC73, CDH1, CDK4, CDKN1B, CDKN2A, CHEK2, CTNNA1, DICER1, EGFR, EPCAM, FH, FLCN, GREM1, HOXB13, KIT, LZTR1, MAX, MBD4, MEN1, MET, MITF, MLH1, MSH2, MSH3, MSH6, MUTYH, NF1, NF2, NTHL1, PALB2, PDGFRA, PMS2, POLD1, POLE, POT1, ZDAZY9F, PTCH1, PTEN, RAD51C, RAD51D, RB1, RET, SDHA, SDHAF2, SDHB, SDHC, SDHD, SMAD4, SMARCA4, SMARCB1, SMARCE1, STK11, SUFU, GGZF668, TP53, TSC1, TSC2, and VHL).  We discussed that many of the genes in the Multi-Cancers panel are associated with specific hereditary cancer syndromes and have published management guidelines: Hereditary Breast and Ovarian Cancer syndrome (BRCA1, BRCA2), Chahal syndrome (MLH1, MSH2, MSH6, PMS2, EPCAM), Familial Adenomatous Polyposis (APC), Hereditary Diffuse Gastric Cancer (CDH1), Familial Atypical Multiple Mole Melanoma syndrome (CDK4, CDKN2A), Juvenile Polyposis syndrome (BMPR1A, SMAD4), Cowden syndrome (PTEN), Li Fraumeni syndrome (TP53), Peutz-Jeghers syndrome (STK11), MUTYH Associated Polyposis (MUTYH), Hereditary Leiomyomatosis and Renal Cell Cancer (FH), Gcyq-Jvae-Ydxg (FLCN), Hereditary Papillary Renal Carcinoma (MET), Hereditary Paraganglioma and Pheochromocytoma syndrome (SDHA, SDHAF2, SDHB, SDHC, SDHD), Multiple Endocrine Neoplasia type 2 (RET), Tuberous sclerosis complex (TSC1, TSC2), Von Hippel-Lindau disease (VHL), Neurofibromatosis type 1 (NF1), Neurofibromatosis type 2 (NF2), Multiple Endocrine Neoplasia type 1 (MEN1), Multiple Endocrine Neoplasia type 4 (CDKN1B), Gorlin syndrome (SUFU, PTCH1), and Leone complex (MKWXA9N).  The ALESSANDRA, AXIN2, BARD1, BRIP1, CHEK2, GREM1, MSH3, NTHL1, PALB2, POLD1, POLE, RAD51C, and RAD51D genes are associated with increased cancer risk and  have published management guidelines for certain cancers.    The remaining genes (AIP, ALK, BAP1, BLM, CDC73, CTNNA1, DICER1, EGFR, HOXB13, KIT, LZTR1, MAX, MBD4, MITF, PDGFRA, POT1, REST, RB1, SMARCA4, SMARCB1, SMARCE1, and DNCR330) are associated with increased cancer risk and may allow us to make medical recommendations when mutations are identified.   Melania stated that she would prefer to submit a saliva kit for her genetic testing. [employer] will send a kit directly to her home with directions on how to collect a saliva sample. We discussed that there is a small chance for sample failure due to contamination of the sample. To help minimize this, she should follow the directions that are sent with the kit. Melania verbalized understanding of this. Once the sample is collected, she will send it to [employer] using the return envelope and prepaid shipping label.   Verbal consent was given over video and written on the consent form. Turnaround time is approximately 4 weeks once the lab receives the sample.  Should Melania have any questions regarding the billing for her genetic testing, she should visit [employer]'s billing website at [email or web address] or call them at [phone].  The possible outcomes of positive, negative, and uncertain were discussed with Melania. A detailed handout that explains this in detail was provided to the patient.  Medical Management: For Melania, we reviewed that the information from genetic testing may determine:  additional cancer screening for which Melania may qualify (i.e. mammogram and breast MRI, more frequent colonoscopies, more frequent dermatologic exams, etc.),  options for risk reducing surgeries Melania could consider (i.e. bilateral mastectomy, surgery to remove her ovaries and/or uterus, etc.),    and targeted chemotherapies if she were to develop certain cancers in the future (i.e. immunotherapy for individuals with Chahal syndrome, PARP  inhibitors, etc.).   These recommendations and possible targeted chemotherapies will be discussed in detail once genetic testing is completed.     Plan:  1) Today Melania elected to proceed with BRCA1/2 testing with automatic reflex to [facility] Multi-Cancers panel.  2) A copy of the consent form and the after visit summary will be sent to Melania.  3) This information should be available in approximately 4 weeks, once the lab receives the sample.  4) I will call Melania with the results once they become available.    Time spent on video: 23 minutes    Shayne Hooks MS, [facility]  Licensed, Certified Genetic Counselor

## 2024-07-03 NOTE — NURSING NOTE
Is the patient currently in the state of [place]? YES    Current patient location: [address] [place] [address]    Visit mode:VIDEO    If the visit is dropped, the patient can be reconnected by: VIDEO VISIT:  Send e-mail to at [email or web address]    Will anyone else be joining the visit? No  (If patient encounters technical issues they should call [phone])    How would you like to obtain your AVS? MyChart    Are changes needed to the allergy or medication list? N/A    Are refills needed on medications prescribed by this physician? NO    Rooming Documentation: Assigned questionnaire(s) completed .    Reason for visit: Consult     ALLY Whitman

## 2024-07-08 ENCOUNTER — LAB REQUISITION (OUTPATIENT)
Dept: LAB | Facility: CLINIC | Age: 39
End: 2024-07-08

## 2024-07-08 DIAGNOSIS — R12 HEARTBURN: ICD-10-CM

## 2024-07-08 DIAGNOSIS — D72.828 OTHER ELEVATED WHITE BLOOD CELL COUNT: ICD-10-CM

## 2024-07-08 LAB
BASOPHILS # BLD AUTO: 0.1 10E3/UL (ref 0–0.2)
BASOPHILS NFR BLD AUTO: 1 %
EOSINOPHIL # BLD AUTO: 0.2 10E3/UL (ref 0–0.7)
EOSINOPHIL NFR BLD AUTO: 2 %
ERYTHROCYTE [DISTWIDTH] IN BLOOD BY AUTOMATED COUNT: 12.6 % (ref 10–15)
HCT VFR BLD AUTO: 41.2 % (ref 35–47)
HGB BLD-MCNC: 13.1 G/DL (ref 11.7–15.7)
IMM GRANULOCYTES # BLD: 0 10E3/UL
IMM GRANULOCYTES NFR BLD: 0 %
LIPASE SERPL-CCNC: 27 U/L (ref 13–60)
LYMPHOCYTES # BLD AUTO: 2.4 10E3/UL (ref 0.8–5.3)
LYMPHOCYTES NFR BLD AUTO: 31 %
MCH RBC QN AUTO: 28.6 PG (ref 26.5–33)
MCHC RBC AUTO-ENTMCNC: 31.8 G/DL (ref 31.5–36.5)
MCV RBC AUTO: 90 FL (ref 78–100)
MONOCYTES # BLD AUTO: 0.7 10E3/UL (ref 0–1.3)
MONOCYTES NFR BLD AUTO: 9 %
NEUTROPHILS # BLD AUTO: 4.5 10E3/UL (ref 1.6–8.3)
NEUTROPHILS NFR BLD AUTO: 57 %
NRBC # BLD AUTO: 0 10E3/UL
NRBC BLD AUTO-RTO: 0 /100
PLATELET # BLD AUTO: 413 10E3/UL (ref 150–450)
RBC # BLD AUTO: 4.58 10E6/UL (ref 3.8–5.2)
WBC # BLD AUTO: 7.9 10E3/UL (ref 4–11)

## 2024-07-08 PROCEDURE — 85025 COMPLETE CBC W/AUTO DIFF WBC: CPT | Performed by: FAMILY MEDICINE

## 2024-07-08 PROCEDURE — 83690 ASSAY OF LIPASE: CPT | Performed by: FAMILY MEDICINE

## 2024-07-28 ENCOUNTER — HEALTH MAINTENANCE LETTER (OUTPATIENT)
Age: 39
End: 2024-07-28

## 2024-08-01 DIAGNOSIS — Z80.0 FAMILY HISTORY OF MALIGNANT NEOPLASM OF COLON: ICD-10-CM

## 2024-08-01 DIAGNOSIS — Z15.09 MONOALLELIC MUTATION OF CDKN2A GENE: ICD-10-CM

## 2024-08-01 DIAGNOSIS — Z15.01 MONOALLELIC MUTATION OF CDKN2A GENE: ICD-10-CM

## 2024-08-01 DIAGNOSIS — Z80.3 FAMILY HISTORY OF MALIGNANT NEOPLASM OF BREAST: ICD-10-CM

## 2024-08-01 DIAGNOSIS — Z80.0 FAMILY HISTORY OF MALIGNANT NEOPLASM OF PANCREAS: Primary | ICD-10-CM

## 2024-08-02 ENCOUNTER — LAB REQUISITION (OUTPATIENT)
Dept: LAB | Facility: CLINIC | Age: 39
End: 2024-08-02
Payer: COMMERCIAL

## 2024-08-02 DIAGNOSIS — R10.13 EPIGASTRIC PAIN: ICD-10-CM

## 2024-08-02 PROCEDURE — 87338 HPYLORI STOOL AG IA: CPT | Performed by: FAMILY MEDICINE

## 2024-08-05 ENCOUNTER — VIRTUAL VISIT (OUTPATIENT)
Dept: ONCOLOGY | Facility: CLINIC | Age: 39
End: 2024-08-05
Attending: GENETIC COUNSELOR, MS
Payer: COMMERCIAL

## 2024-08-05 DIAGNOSIS — Z15.01 MONOALLELIC MUTATION OF CDKN2A GENE: Primary | ICD-10-CM

## 2024-08-05 DIAGNOSIS — Z80.3 FAMILY HISTORY OF MALIGNANT NEOPLASM OF BREAST: ICD-10-CM

## 2024-08-05 DIAGNOSIS — Z15.09 MONOALLELIC MUTATION OF CDKN2A GENE: Primary | ICD-10-CM

## 2024-08-05 DIAGNOSIS — Z80.0 FAMILY HISTORY OF MALIGNANT NEOPLASM OF PANCREAS: ICD-10-CM

## 2024-08-05 DIAGNOSIS — Z80.0 FAMILY HISTORY OF MALIGNANT NEOPLASM OF COLON: ICD-10-CM

## 2024-08-05 PROCEDURE — 96040 HC GENETIC COUNSELING, EACH 30 MINUTES: CPT | Mod: GT,95 | Performed by: GENETIC COUNSELOR, MS

## 2024-08-05 NOTE — NURSING NOTE
Current patient location: [address] [place] [address]    Is the patient currently in the state of [place]? YES    Visit mode:VIDEO    If the visit is dropped, the patient can be reconnected by: VIDEO VISIT: Text to cell phone:   Telephone Information:   Mobile [phone]       Will anyone else be joining the visit? NO  (If patient encounters technical issues they should call [phone]    How would you like to obtain your AVS? MyChart    Are changes needed to the allergy or medication list? N/A    Are refills needed on medications prescribed by this physician? NO    Rooming Documentation:  Not applicable    Reason for visit: KATE CANALES

## 2024-08-05 NOTE — Clinical Note
"8/5/2024      Melania Lopez  [address] [place] [address]      Dear Colleague,    Thank you for referring your patient, Melania Lopez, to the [facility]. Please see a copy of my visit note below.    Virtual Visit Details    Type of service:  Video Visit     Originating Location (pt. Location): {video visit patient location:048703::\"Home\"}  {PROVIDER LOCATION On-site should be selected for visits conducted from your clinic location or adjoining [facility] hospital, academic office, or other nearby [facility] building. Off-site should be selected for all other provider locations, including home:058472}  Distant Location (provider location):  {virtual location provider:572583}  Platform used for Video Visit: {Virtual Visit Platforms:964555::\"[employer]\"}      Again, thank you for allowing me to participate in the care of your patient.        Sincerely,        Shayne Hooks GC  "

## 2024-08-05 NOTE — PROGRESS NOTES
"8/5/2024    Referring Provider: Daniella Edwards MD    Presenting Information:   I spoke to Melania by video today to discuss her genetic testing results. A saliva kit was sent to her house on 7/3/24. The Multi-Cancers panel was ordered from [facility]. This testing was done because of Melania's family history of pancreatic, breast, and colon cancer.     Genetic Testing Results: POSITIVE  Melania is POSITIVE for a CDKN2A mutation. Specifically her mutation is called c.377T>A (p.[id number]). We discussed that this mutation is associated with a diagnosis of CDKN2A and increased risk for pancreatic cancer and melanoma. We discussed the impact of this testing on Melania in detail.     Of note, Melania is negative for any pathogenic (harmful) mutations in the AIP, ALK, APC, ALESSANDRA, AXIN2, BAP1, BARD1, BLM, BMPR1A, BRCA1, BRCA2, BRIP1, CDC73, CDH1, CDK4, CDKN1B, CHEK2, CTNNA1, DICER1, EGFR, EPCAM, FH, FLCN, GREM1, HOXB13, KIT, LZTR1, MAX, MBD4, MEN1, MET, MITF, MLH1, MSH2, MSH3, MSH6, MUTYH, NF1, NF2, NTHL1, PALB2, PDGFRA, PMS2, POLD1, POLE, POT1, IWMDQ9C, PTCH1, PTEN, RAD51C, RAD51D, RB1, RET, SDHA, SDHAF2, SDHB, SDHC, SDHD, SMAD4, SMARCA4, SMARCB1, SMARCE1, STK11, SUFU, [id number], TP53, TSC1, TSC2, and VHL genes. No pathogenic mutations were found in any of the other 69 genes analyzed. This test involved sequencing and deletion/duplication analysis of all genes with the exceptions of EPCAM and GREM1 (deletions/duplications only), MITF (only the status of the c.952G>A (p.E318K) alteration is analyzed and reported), and SDHA (sequencing only). We reviewed the autosomal dominant inheritance of these genes. Melania cannot pass on a mutation in any of these genes to her children based on this test result. Mutations in these genes do not skip generations.      A copy of the test report can be found in the Laboratory tab, dated 7/17/24, and named \"LABORATORY MISCELLANEOUS ORDER\". The report is scanned in as a linked document.    CDKN2A Cancer " Risks:  Melanoma:  The lifetime risk for melanoma is between 28-76% (PMID 39467430, PMID 22423274). Some studies indicate that these risks may vary, due to different factors.  Pancreatic Cancer:  The exact risk of pancreatic cancer can depend on a person s specific CDKN2A mutation and/or environmental exposures, but has been reported as 17% by age 75 by one larger study (PMID: 23974255). The National Comprehensive Cancer Network (NCCN) states that the lifetime risk of pancreatic cancer is greater than 15%.    CDKN2A mutations may also confer increased risks for other cancers, however more research is needed in this area. Exact risk estimates for additional cancer risks are not available at this time.     Cancer Screening and Prevention (Erica Shay et al 2009, Margie et al 2013, Marilyn et al 2015):  Melanoma screening:  Total body skin exam, starting at age 10  Nevi should be examined for ABCDE features of melanoma  Evaluation by their provider every 6 months. Once the patient understands how to conduct a self-examination, these visits can be conducted annually  Monthly total body skin exam performed by the patient with the help of a friend or family member.  Changes should be reported to a physician.   Pancreatic cancer screening:  Pancreatic cancer screening with endoscopic ultrasonography (EUS) and/or MRI/magnetic resonance cholangiopancreatography can be considered for individuals with a CDKN2A mutation. The American College of Gastroenterology recommends this for individuals with CDKN2A mutations. The National Comprehensive Cancer Network (NCCN) recommends considering screening beginning at age 40 (or 10 years younger than the earliest pancreatic cancer diagnosis in the family, whichever comes first).   In Melania s case, pancreatic cancer screening could be considered at age 36.     We discussed that Melania could be seen by a GI specialist to discuss an individual screening plan and assists with medical  management. A referral was placed for Melania to be seen by Dr. Hector Kaiser or Dr. Rodrigue Goodman to discuss pancreatic cancer screening.    Other screening based on Melania's personal and family history:  Other population cancer screening options, such as those recommended by the American Cancer Society and the National Comprehensive Cancer Network (NCCN), are also appropriate for Melania and her family. These screening recommendations may change if there are changes to Melania's personal and/or family history of cancer. Final screening recommendations should be made in consultation with each individual's primary care provider.    Of note, the above information is based on our current understanding of Melania's genetic findings. Melania is encouraged to reach out to me regularly regarding any pertinent updates to her personal and/or family history of cancer, as our understanding of the genetic findings in her family may change over time.     Implications for Family Members:  We reviewed that mutations in the CDKN2A gene are inherited in an autosomal dominant pattern. This means that each of Melania's children, should she have any, have a 50% chance of inheriting the same mutation. Likewise, her sister has a 50% risk of having the same mutation. Extended relatives may also carry this mutation, and she is encouraged to share this information with her family members on both sides of the family, However, the mutation is most likely coming from her paternal side of the family. I am happy to help her relatives connect with a genetic counselor in their area if they would like to discuss testing.     We discussed that her sister already had negative genetic testing, however, I still encouraged Melania to share this information with her sister so that we can confirm that she is truly negative for this mutation.    Additional Testing Considerations:  Even though Melania's genetic testing result was positive, other relatives may  "carry the same CDKN2A mutation or a different gene mutation associated with breast, pancreatic, and/or colon cancer. Genetic counseling is recommended for other close paternal relatives to discuss genetic testing options. Her sister should talk to her Genetic Counselor about next steps in verifying that she is negative for the familial mutation as well. If any of these relatives do pursue genetic testing, Melania is encouraged to contact me so that we may review the impact of their test results on her.    Plan:  1.  A copy of the test results will be sent to Melania.  2.  I will provide a \"Dear Relative\" letter for Melania to share with her family members.  3.  She plans to follow up with other providers.  4.  A referral was placed for Melania to meet with Dr. Hector Kaiser or Dr. Rodrigue Goodman to discuss pancreatic cancer screening associated with a CDKN2A mutation.    If Melania has additional questions, I encouraged her to contact me directly via [employer].     Time spent on video: 16 minutes    Shayne Hooks MS, [facility] [place]  Licensed, Certified Genetic Counselor      Virtual Visit Details    Type of service:  Video Visit     Originating Location (pt. Location): Home  Distant Location (provider location):  Off-site  Platform used for Video Visit: Erika"

## 2024-08-05 NOTE — LETTER
August 5, 2024    Melania Lopez  [address]  [place] [address]      Dear Melania,    It was a pleasure speaking with you over video on 8/5/2024. Here is a copy of the progress note from our discussion. If you have any additional questions, please feel free to call.    Referring Provider: Daniella Edwards MD    Presenting Information:   I spoke to Melania by video today to discuss her genetic testing results. A saliva kit was sent to her house on 7/3/24. The Multi-Cancers panel was ordered from [facility]. This testing was done because of Melania's family history of pancreatic, breast, and colon cancer.     Genetic Testing Results: POSITIVE  Melania is POSITIVE for a CDKN2A mutation. Specifically her mutation is called c.377T>A (p.[id number]). We discussed that this mutation is associated with a diagnosis of CDKN2A and increased risk for pancreatic cancer and melanoma. We discussed the impact of this testing on Melania in detail.     Of note, Melania is negative for any pathogenic (harmful) mutations in the AIP, ALK, APC, ALESSANDRA, AXIN2, BAP1, BARD1, BLM, BMPR1A, BRCA1, BRCA2, BRIP1, CDC73, CDH1, CDK4, CDKN1B, CHEK2, CTNNA1, DICER1, EGFR, EPCAM, FH, FLCN, GREM1, HOXB13, KIT, LZTR1, MAX, MBD4, MEN1, MET, MITF, MLH1, MSH2, MSH3, MSH6, MUTYH, NF1, NF2, NTHL1, PALB2, PDGFRA, PMS2, POLD1, POLE, POT1, RZOHF9Q, PTCH1, PTEN, RAD51C, RAD51D, RB1, RET, SDHA, SDHAF2, SDHB, SDHC, SDHD, SMAD4, SMARCA4, SMARCB1, SMARCE1, STK11, SUFU, PFQR569, TP53, TSC1, TSC2, and VHL genes. No pathogenic mutations were found in any of the other 69 genes analyzed. This test involved sequencing and deletion/duplication analysis of all genes with the exceptions of EPCAM and GREM1 (deletions/duplications only), MITF (only the status of the c.952G>A (p.E318K) alteration is analyzed and reported), and SDHA (sequencing only). We reviewed the autosomal dominant inheritance of these genes. Melania cannot pass on a mutation in any of these genes to her children  "based on this test result. Mutations in these genes do not skip generations.      A copy of the test report can be found in the Laboratory tab, dated 7/17/24, and named \"LABORATORY MISCELLANEOUS ORDER\". The report is scanned in as a linked document.      CDKN2A Cancer Risks:  Melanoma:  The lifetime risk for melanoma is between 28-76% (PMID 05034995, PMID 93802862). Some studies indicate that these risks may vary, due to different factors.  Pancreatic Cancer:  The exact risk of pancreatic cancer can depend on a person s specific CDKN2A mutation and/or environmental exposures, but has been reported as 17% by age 75 by one larger study (PMID: 11347233). The National Comprehensive Cancer Network (NCCN) states that the lifetime risk of pancreatic cancer is greater than 15%.    CDKN2A mutations may also confer increased risks for other cancers, however more research is needed in this area. Exact risk estimates for additional cancer risks are not available at this time.     Cancer Screening and Prevention (Erica Shay et al 2009, Margie et al 2013, Marilny et al 2015):  Melanoma screening:  Total body skin exam, starting at age 10  Nevi should be examined for ABCDE features of melanoma  Evaluation by their provider every 6 months. Once the patient understands how to conduct a self-examination, these visits can be conducted annually  Monthly total body skin exam performed by the patient with the help of a friend or family member.  Changes should be reported to a physician.   Pancreatic cancer screening:  Pancreatic cancer screening with endoscopic ultrasonography (EUS) and/or MRI/magnetic resonance cholangiopancreatography can be considered for individuals with a CDKN2A mutation. The American College of Gastroenterology recommends this for individuals with CDKN2A mutations. The National Comprehensive Cancer Network (NCCN) recommends considering screening beginning at age 40 (or 10 years younger than the earliest pancreatic " cancer diagnosis in the family, whichever comes first).   In Melania s case, pancreatic cancer screening could be considered at age 36.     We discussed that Melania could be seen by a GI specialist to discuss an individual screening plan and assists with medical management. A referral was placed for Melania to be seen by Dr. Hector Kaiser or Dr. Rodrigue Goodman to discuss pancreatic cancer screening.    Other screening based on Melania's personal and family history:  Other population cancer screening options, such as those recommended by the American Cancer Society and the National Comprehensive Cancer Network (NCCN), are also appropriate for Melania and her family. These screening recommendations may change if there are changes to Melania's personal and/or family history of cancer. Final screening recommendations should be made in consultation with each individual's primary care provider.    Of note, the above information is based on our current understanding of Melania's genetic findings. Melania is encouraged to reach out to me regularly regarding any pertinent updates to her personal and/or family history of cancer, as our understanding of the genetic findings in her family may change over time.     Implications for Family Members:  We reviewed that mutations in the CDKN2A gene are inherited in an autosomal dominant pattern. This means that each of Melania's children, should she have any, have a 50% chance of inheriting the same mutation. Likewise, her sister has a 50% risk of having the same mutation. Extended relatives may also carry this mutation, and she is encouraged to share this information with her family members on both sides of the family, However, the mutation is most likely coming from her paternal side of the family. I am happy to help her relatives connect with a genetic counselor in their area if they would like to discuss testing.     We discussed that her sister already had negative genetic testing,  "however, I still encouraged Melania to share this information with her sister so that we can confirm that she is truly negative for this mutation.    Additional Testing Considerations:  Even though Melania's genetic testing result was positive, other relatives may carry the same CDKN2A mutation or a different gene mutation associated with breast, pancreatic, and/or colon cancer. Genetic counseling is recommended for other close paternal relatives to discuss genetic testing options. Her sister should talk to her Genetic Counselor about next steps in verifying that she is negative for the familial mutation as well. If any of these relatives do pursue genetic testing, Melania is encouraged to contact me so that we may review the impact of their test results on her.    Plan:  1.  A copy of the test results will be sent to Melania.  2.  I will provide a \"Dear Relative\" letter for Melania to share with her family members.  3.  She plans to follow up with other providers.  4.  A referral was placed for Melania to meet with Dr. Hector Kaiser or Dr. Rodrigue Goodman to discuss pancreatic cancer screening associated with a CDKN2A mutation.    If Melania has additional questions, I encouraged her to contact me directly via [employer].     Time spent on video: 16 minutes    Shayne Hooks MS, [facility] [place]  Licensed, Certified Genetic Counselor  "

## 2024-08-06 LAB — H PYLORI AG STL QL IA: NEGATIVE

## 2024-08-07 NOTE — PATIENT INSTRUCTIONS
8/7/2024    Dear Relative:    The purpose of this letter is to inform you that your relative recently underwent genetic counseling and genetic testing for mutations in genes found in another family member. The testing done through [facility] identified a mutation in the CDKN2A gene. Specifically, this mutation is called c.377T>A (p.[id number]). The accession number linked to their test result is [id number].    A mutation (or change in the genetic code) causes a specific gene to stop working properly. Ultimately, individuals who have a mutation in the CDKN2A gene have an increased risk for cancer.     CDKN2A mutations increase the lifetime risk of melanoma and pancreatic cancer. The lifetime risk for melanoma is between 28-76%, though these risks may vary due to different factors, such as where a person lives. The exact risk of pancreatic cancer can depend on a person s specific CDKN2A mutation and/or environmental exposures, but has been reported as about 17% by age 75.     If individuals are found to have a mutation in the CDKN2A gene, we would recommend increased melanoma screening at a young age. Individuals with CDKN2A mutation can also be considered for pancreatic cancer screening. Both men and women have a 50% chance of passing this mutation on to each of their children.    I understand that this may be surprising, unexpected, and even scary news. Because this mutation has been identified in your family, you are at risk for having the same mutation. As mentioned earlier, your children may also be at risk.      Scheduling a genetic counseling appointment does not mean you have to undergo genetic testing. The decision to pursue such testing is a very personal one that is discussed in more detail during the session. Much of cancer genetic counseling is providing valuable information to individuals who are impacted by genetic information such as this.      If you are interested in scheduling a genetic counseling  [facility], please call [phone] to schedule an appointment. You can also find a genetic counselor close to you or at another health system at [facility]    Sincerely,   Shayne Hooks MS, [facility] [place]  Licensed, Certified Genetic Counselor

## 2025-03-13 ENCOUNTER — OFFICE VISIT (OUTPATIENT)
Dept: GASTROENTEROLOGY | Facility: CLINIC | Age: 40
End: 2025-03-13
Attending: GENETIC COUNSELOR, MS
Payer: COMMERCIAL

## 2025-03-13 VITALS
HEIGHT: 64 IN | HEART RATE: 93 BPM | OXYGEN SATURATION: 100 % | BODY MASS INDEX: 31.24 KG/M2 | SYSTOLIC BLOOD PRESSURE: 134 MMHG | DIASTOLIC BLOOD PRESSURE: 87 MMHG | WEIGHT: 183 LBS

## 2025-03-13 DIAGNOSIS — Z80.0 FAMILY HISTORY OF PANCREATIC CANCER: Primary | ICD-10-CM

## 2025-03-13 RX ORDER — LISDEXAMFETAMINE DIMESYLATE 40 MG/1
40 CAPSULE ORAL EVERY MORNING
COMMUNITY

## 2025-03-13 ASSESSMENT — PAIN SCALES - GENERAL: PAINLEVEL_OUTOF10: NO PAIN (0)

## 2025-03-13 NOTE — NURSING NOTE
"Do you have a history of colon cancer in your immediate family? YES    If yes who: maternal grandfather     And what age  were they diagnosed: 70 yrs      Chief Complaint   Patient presents with    New Patient       Vitals:    03/13/25 0650   BP: 134/87   Pulse: 93   SpO2: 100%   Weight: 83 kg (183 lb)   Height: 1.626 m (5' 4\")       Body mass index is 31.41 kg/m .    Myah Mckoy MA    "

## 2025-03-13 NOTE — PATIENT INSTRUCTIONS
Follow up:    Dr. Kaiser has outlined the following steps after your recent clinic visit:    MRCP. Please call [phone] to schedule the imaging.     Dr. Kaiser will reach out after this imaging via [employer]    Plan is to follow up next year with RUBI Pearson for continued surveillance.         Please call with any questions or concerns regarding your clinic visit today.    It is a pleasure being involved in your health care.    Contacts post-consultation depending on your need:    Schedule Clinic Appointments            [phone] # 1   M-F 7:30 - 5 pm    Joann Chicas, RN Care Coordinator (Dr. Kaiser/Dr. Andino)    [phone]    Lucrecia Weeks, RN Care Coordinator (Dr. Liriano/Dr. Henderson)                            [phone]    Valentina Calix RN Care Coordinator (Dr. Goodman/Dr. White)    [phone]    RUBI Wilcox  [phone]    [facility]  (all MDs)     [phone]      For urgent/emergent questions after business hours, you may reach the on-call GI Fellow by contacting the [facility]  at [phone].    How do I schedule labs, imaging studies, or procedures that were ordered in clinic today?     Labs: To schedule lab appointment at the [facility], use my chart or call [phone]. If you have a [place] lab closer to home where you are regularly seen you can give them a call.     Procedures: If a colonoscopy, upper endoscopy, breath test, esophageal manometry, or pH impedence was ordered today, our endoscopy team will call you to schedule this. If you have not heard from our endoscopy team within a week, please call [phone] to schedule.     Imaging Studies: If you were scheduled for a CT scan, X-ray, MRI, ultrasound, HIDA scan or other imaging study, please call [phone] to have this scheduled.     Referral: If a referral to another specialty was ordered, expect a phone call or follow  instructions above. If you have not heard from anyone regarding your referral in a week, please call our clinic to check the status.     How to I schedule a follow-up visit?  If you did not schedule a follow-up visit today, please call [phone] to schedule a follow-up office visit.

## 2025-03-13 NOTE — LETTER
3/13/2025      Melania Lopez  [address] [place] [address]      Dear Colleague,    Thank you for referring your patient, Melania Lopez, to the [facility] [place]. Please see a copy of my visit note below.        [facility]    CC/Referring Physician:  Shayne Hooks  Question for Consultation:  Pancreatic cancer screening    Assessment and Plan:  Ms Lopez is a 41yo woman with CDKN2A mutation and a strong family history of pancreatic cancer. We reviewed that a CDKN2A mutation confers a lifetime risk of pancreatic cancer upwards of 15-20%, as well as upwards of a 75% lifetime risk of melanoma. Guidelines recommend pancreatic cancer screening begin at age 40 or 10y prior to the diagnosis of the youngest relative. Therefore we will begin screening now. We reviewed the two recommended modalities of screening, both equally sensitive, IV contrasted MRI/MRCP and endoscopic ultrasound (EUS). MRI/MRCP is noninvasive though does not allow sampling as would EUS, though EUS requires sedation and introduces minimal risks of pancreatitis, aspiration, bleeding and perforation. Therefore we agreed to survey her pancreas yearly by MRI/MRCP until such time as a lesion is demonstrated to reflex an EUS. We discussed independent risk factors of pancreatic disease including tobacco, alcohol and obesity. We reviewed healthy diet and daily exercise.     She will also require yearly skin evaluation and colonoscopy at age 47.    RTC in one year with Krys Pearson    It was a pleasure to participate in the care of this patient; please contact us with any further questions.  A total of at least 60 minutes was spent on the day of the encounter, including chart review, history and exam, counseling, documentation and further activities as delineated by this note.    Hector Kaiser MD PhD FACG SAQIB BOURGEOIS  Professor of Medicine, Surgery and  Pediatrics  Interventional and Therapeutic Endoscopy  Chief, Division of Gastroenterology and Hepatology and Nutrition  GI Service Line Medical Director    [facility] [address] [place], [place] [address]    New Consultations  [phone]  Procedure Scheduling [phone]  Clinical Nurse Coordinator [phone]  Clinical Fax   [phone]  Administrative   [phone]  Administrative Fax  [phone]     -------------------------------------------------------------------------------------------------------------------  History of Presentation:    Ms Lopez is a 40 year old woman with a monoallelic CDKN2A mutation and a family history pancreatic cancer who presents to discuss her personal risk and to assess surveillance opportunities. Her father was diagnosed and  from pancreatic cancer at 60, her maternal grandmother was diagnosed at 46 and  at 49 and her great grandmother at 42. She also has a family history of colon cancer with her maternal grandfather at 65. Her father was also diagnosed with skin cancer. Genetic evaluation demonstrated the mutation.    She has no abdominal complaints and her bowel habits are regular. She does not smoke tobacco or drink alcohol. She has no recent cross sectional abdominal imaging.    Review of systems:  A twelve point review was performed and was otherwise negative beyond what is mentioned in the history above.    Pertinent past medical history:  Reflux  Obesity    Previous surgeries:  EGD    Current mediations:  Current Outpatient Medications   Medication Sig Dispense Refill     omeprazole (PRILOSEC) 20 MG DR capsule Take 1 capsule (20 mg) by mouth daily 30 capsule 0     No current facility-administered medications for this visit.     Medications reviewed with patient today, see Medication List/Assessment for details.  No other NSAID/anticoagulation reported by patient.  No other OTC/herbal/supplements reported  by patient.    Social history:  Social History     Socioeconomic History     Marital status: Single     Spouse name: Not on file     Number of children: Not on file     Years of education: Not on file     Highest education level: Not on file   Occupational History     Not on file   Tobacco Use     Smoking status: Not on file     Smokeless tobacco: Not on file   Substance and Sexual Activity     Alcohol use: Not on file     Drug use: Not on file     Sexual activity: Not on file   Other Topics Concern     Not on file   Social History Narrative     Not on file     Social Drivers of Health     Financial Resource Strain: Not on file   Food Insecurity: Not on file   Transportation Needs: Not on file   Physical Activity: Not on file   Stress: Not on file   Social Connections: Not on file   Interpersonal Safety: Not on file   Housing Stability: Not on file       Family history:  As per HPI    PHYSICAL EXAMINATION:  Vitals reviewed, AFVSS   Wt   Wt Readings from Last 2 Encounters:   06/14/24 90.7 kg (200 lb)      Gen: nontoxic, aaox3, cooperative, pleasant, not dyspneic/diaphoretic  HEENT: neck supple, normal op w/o ulcer/exudate, anicteric  Resp/CV unremarkable without significant finding  Abd: benign, soft, nondistended, intact bs, no hepatosplenomegaly, nontender, no peritoneal signs  Ext: no c/c/e  Skin: warm, perfused, no jaundice  Neuro: grossly intact    Pertinent outside studies:        Again, thank you for allowing me to participate in the care of your patient.        Sincerely,        Hector Kaiser MD    Electronically signed

## 2025-03-13 NOTE — PROGRESS NOTES
[facility]    CC/Referring Physician:  Shayne Hooks  Question for Consultation:  Pancreatic cancer screening    Assessment and Plan:  Ms Lopez is a 39yo woman with CDKN2A mutation and a strong family history of pancreatic cancer. We reviewed that a CDKN2A mutation confers a lifetime risk of pancreatic cancer upwards of 15-20%, as well as upwards of a 75% lifetime risk of melanoma. Guidelines recommend pancreatic cancer screening begin at age 40 or 10y prior to the diagnosis of the youngest relative. Therefore we will begin screening now. We reviewed the two recommended modalities of screening, both equally sensitive, IV contrasted MRI/MRCP and endoscopic ultrasound (EUS). MRI/MRCP is noninvasive though does not allow sampling as would EUS, though EUS requires sedation and introduces minimal risks of pancreatitis, aspiration, bleeding and perforation. Therefore we agreed to survey her pancreas yearly by MRI/MRCP until such time as a lesion is demonstrated to reflex an EUS. We discussed independent risk factors of pancreatic disease including tobacco, alcohol and obesity. We reviewed healthy diet and daily exercise.     She will also require yearly skin evaluation and colonoscopy at age 47.    RTC in one year with Krys Pearson    It was a pleasure to participate in the care of this patient; please contact us with any further questions.  A total of at least 60 minutes was spent on the day of the encounter, including chart review, history and exam, counseling, documentation and further activities as delineated by this note.    Hector Kaiser MD PhD FELIPAG SAQIB BOURGEOIS  Professor of Medicine, Surgery and Pediatrics  Interventional and Therapeutic Endoscopy  Chief, Division of Gastroenterology and Hepatology and Nutrition  GI Service Line Medical Director    [facility] [address] [id number]  Consultations  [phone]  Procedure Scheduling [phone]  Clinical Nurse Coordinator [phone]  Clinical Fax   [phone]  Administrative   [phone]  Administrative Fax  [phone]     -------------------------------------------------------------------------------------------------------------------  History of Presentation:    Ms Lopez is a 40 year old woman with a monoallelic CDKN2A mutation and a family history pancreatic cancer who presents to discuss her personal risk and to assess surveillance opportunities. Her father was diagnosed and  from pancreatic cancer at 60, her maternal grandmother was diagnosed at 46 and  at 49 and her great grandmother at 42. She also has a family history of colon cancer with her maternal grandfather at 65. Her father was also diagnosed with skin cancer. Genetic evaluation demonstrated the mutation.    She has no abdominal complaints and her bowel habits are regular. She does not smoke tobacco or drink alcohol. She has no recent cross sectional abdominal imaging.    Review of systems:  A twelve point review was performed and was otherwise negative beyond what is mentioned in the history above.    Pertinent past medical history:  Reflux  Obesity    Previous surgeries:  EGD    Current mediations:  Current Outpatient Medications   Medication Sig Dispense Refill    omeprazole (PRILOSEC) 20 MG DR capsule Take 1 capsule (20 mg) by mouth daily 30 capsule 0     No current facility-administered medications for this visit.     Medications reviewed with patient today, see Medication List/Assessment for details.  No other NSAID/anticoagulation reported by patient.  No other OTC/herbal/supplements reported by patient.    Social history:  Social History     Socioeconomic History    Marital status: Single     Spouse name: Not on file    Number of children: Not on file    Years of education: Not on file    Highest education level: Not on file   Occupational History     Not on file   Tobacco Use    Smoking status: Not on file    Smokeless tobacco: Not on file   Substance and Sexual Activity    Alcohol use: Not on file    Drug use: Not on file    Sexual activity: Not on file   Other Topics Concern    Not on file   Social History Narrative    Not on file     Social Drivers of Health     Financial Resource Strain: Not on file   Food Insecurity: Not on file   Transportation Needs: Not on file   Physical Activity: Not on file   Stress: Not on file   Social Connections: Not on file   Interpersonal Safety: Not on file   Housing Stability: Not on file       Family history:  As per HPI    PHYSICAL EXAMINATION:  Vitals reviewed, AFVSS   Wt   Wt Readings from Last 2 Encounters:   06/14/24 90.7 kg (200 lb)      Gen: nontoxic, aaox3, cooperative, pleasant, not dyspneic/diaphoretic  HEENT: neck supple, normal op w/o ulcer/exudate, anicteric  Resp/CV unremarkable without significant finding  Abd: benign, soft, nondistended, intact bs, no hepatosplenomegaly, nontender, no peritoneal signs  Ext: no c/c/e  Skin: warm, perfused, no jaundice  Neuro: grossly intact    Pertinent outside studies:

## 2025-06-29 ENCOUNTER — HEALTH MAINTENANCE LETTER (OUTPATIENT)
Age: 40
End: 2025-06-29

## 2025-06-30 ENCOUNTER — LAB REQUISITION (OUTPATIENT)
Dept: LAB | Facility: CLINIC | Age: 40
End: 2025-06-30

## 2025-06-30 DIAGNOSIS — R74.8 ABNORMAL LEVELS OF OTHER SERUM ENZYMES: ICD-10-CM

## 2025-06-30 PROCEDURE — 84155 ASSAY OF PROTEIN SERUM: CPT | Performed by: FAMILY MEDICINE

## 2025-06-30 PROCEDURE — 80061 LIPID PANEL: CPT | Performed by: FAMILY MEDICINE

## 2025-07-01 LAB
ALBUMIN SERPL BCG-MCNC: 4.2 G/DL (ref 3.5–5.2)
ALP SERPL-CCNC: 53 U/L (ref 40–150)
ALT SERPL W P-5'-P-CCNC: 9 U/L (ref 0–50)
ANION GAP SERPL CALCULATED.3IONS-SCNC: 13 MMOL/L (ref 7–15)
AST SERPL W P-5'-P-CCNC: 18 U/L (ref 0–45)
BILIRUB SERPL-MCNC: 0.4 MG/DL
BUN SERPL-MCNC: 12.7 MG/DL (ref 6–20)
CALCIUM SERPL-MCNC: 9.4 MG/DL (ref 8.8–10.4)
CHLORIDE SERPL-SCNC: 106 MMOL/L (ref 98–107)
CHOLEST SERPL-MCNC: 164 MG/DL
CREAT SERPL-MCNC: 0.7 MG/DL (ref 0.51–0.95)
EGFRCR SERPLBLD CKD-EPI 2021: >90 ML/MIN/1.73M2
FASTING STATUS PATIENT QL REPORTED: NO
FASTING STATUS PATIENT QL REPORTED: NO
GLUCOSE SERPL-MCNC: 106 MG/DL (ref 70–99)
HCO3 SERPL-SCNC: 20 MMOL/L (ref 22–29)
HDLC SERPL-MCNC: 40 MG/DL
LDLC SERPL CALC-MCNC: 105 MG/DL
NONHDLC SERPL-MCNC: 124 MG/DL
POTASSIUM SERPL-SCNC: 3.9 MMOL/L (ref 3.4–5.3)
PROT SERPL-MCNC: 7 G/DL (ref 6.4–8.3)
SODIUM SERPL-SCNC: 139 MMOL/L (ref 135–145)
TRIGL SERPL-MCNC: 94 MG/DL